# Patient Record
Sex: MALE | Race: WHITE | Employment: FULL TIME | URBAN - METROPOLITAN AREA
[De-identification: names, ages, dates, MRNs, and addresses within clinical notes are randomized per-mention and may not be internally consistent; named-entity substitution may affect disease eponyms.]

---

## 2017-06-22 PROBLEM — E11.42 DM TYPE 2 WITH DIABETIC PERIPHERAL NEUROPATHY (HCC): Status: ACTIVE | Noted: 2017-06-22

## 2017-06-22 PROBLEM — L97.512 NON-PRESSURE CHRONIC ULCER OF OTHER PART OF RIGHT FOOT WITH FAT LAYER EXPOSED (HCC): Status: ACTIVE | Noted: 2017-06-22

## 2017-07-26 PROBLEM — I87.8 POOR VENOUS ACCESS: Status: ACTIVE | Noted: 2017-07-26

## 2019-12-14 ENCOUNTER — HOSPITAL ENCOUNTER (OUTPATIENT)
Age: 44
Discharge: HOME | End: 2019-12-16
Payer: COMMERCIAL

## 2019-12-14 PROCEDURE — 87075 CULTR BACTERIA EXCEPT BLOOD: CPT

## 2019-12-14 PROCEDURE — 87070 CULTURE OTHR SPECIMN AEROBIC: CPT

## 2019-12-14 PROCEDURE — 87205 SMEAR GRAM STAIN: CPT

## 2019-12-15 LAB — GRAM STAIN ORDERABLE: NORMAL

## 2019-12-16 LAB — WOUND/ABSCESS: NORMAL

## 2019-12-17 LAB
ANAEROBIC CULTURE: ABNORMAL
ORGANISM: ABNORMAL

## 2021-03-25 ENCOUNTER — ANESTHESIA EVENT (OUTPATIENT)
Dept: OPERATING ROOM | Age: 46
End: 2021-03-25
Payer: COMMERCIAL

## 2021-03-25 ENCOUNTER — TELEPHONE (OUTPATIENT)
Dept: PODIATRY | Age: 46
End: 2021-03-25

## 2021-03-25 RX ORDER — MULTIVIT WITH MINERALS/LUTEIN
1000 TABLET ORAL DAILY
COMMUNITY

## 2021-03-25 RX ORDER — DULAGLUTIDE 1.5 MG/.5ML
1.5 INJECTION, SOLUTION SUBCUTANEOUS WEEKLY
COMMUNITY

## 2021-03-26 ENCOUNTER — ANESTHESIA (OUTPATIENT)
Dept: OPERATING ROOM | Age: 46
End: 2021-03-26
Payer: COMMERCIAL

## 2021-03-26 ENCOUNTER — HOSPITAL ENCOUNTER (OUTPATIENT)
Age: 46
Setting detail: OUTPATIENT SURGERY
Discharge: HOME | End: 2021-03-26
Attending: PODIATRIST | Admitting: PODIATRIST
Payer: COMMERCIAL

## 2021-03-26 VITALS
DIASTOLIC BLOOD PRESSURE: 78 MMHG | OXYGEN SATURATION: 99 % | RESPIRATION RATE: 32 BRPM | SYSTOLIC BLOOD PRESSURE: 113 MMHG | TEMPERATURE: 95.5 F

## 2021-03-26 VITALS
TEMPERATURE: 97.8 F | SYSTOLIC BLOOD PRESSURE: 122 MMHG | HEART RATE: 91 BPM | HEIGHT: 74 IN | DIASTOLIC BLOOD PRESSURE: 82 MMHG | OXYGEN SATURATION: 98 % | BODY MASS INDEX: 35.94 KG/M2 | WEIGHT: 280 LBS | RESPIRATION RATE: 21 BRPM

## 2021-03-26 DIAGNOSIS — G89.18 POST-OP PAIN: Primary | ICD-10-CM

## 2021-03-26 DIAGNOSIS — Z01.812 PRE-OPERATIVE LABORATORY EXAMINATION: ICD-10-CM

## 2021-03-26 LAB
ANION GAP SERPL CALCULATED.3IONS-SCNC: 11 MMOL/L (ref 7–16)
BUN BLDV-MCNC: 16 MG/DL (ref 6–20)
CALCIUM SERPL-MCNC: 8.9 MG/DL (ref 8.6–10.2)
CHLORIDE BLD-SCNC: 104 MMOL/L (ref 98–107)
CO2: 25 MMOL/L (ref 22–29)
CREAT SERPL-MCNC: 0.7 MG/DL (ref 0.7–1.2)
GFR AFRICAN AMERICAN: >60
GFR NON-AFRICAN AMERICAN: >60 ML/MIN/1.73
GLUCOSE BLD-MCNC: 121 MG/DL (ref 74–99)
HCT VFR BLD CALC: 43 % (ref 37–54)
HEMOGLOBIN: 14.4 G/DL (ref 12.5–16.5)
MCH RBC QN AUTO: 32.6 PG (ref 26–35)
MCHC RBC AUTO-ENTMCNC: 33.5 % (ref 32–34.5)
MCV RBC AUTO: 97.3 FL (ref 80–99.9)
METER GLUCOSE: 129 MG/DL (ref 74–99)
PDW BLD-RTO: 12.8 FL (ref 11.5–15)
PLATELET # BLD: 334 E9/L (ref 130–450)
PMV BLD AUTO: 9.3 FL (ref 7–12)
POTASSIUM SERPL-SCNC: 4.2 MMOL/L (ref 3.5–5)
RBC # BLD: 4.42 E12/L (ref 3.8–5.8)
SODIUM BLD-SCNC: 140 MMOL/L (ref 132–146)
WBC # BLD: 8.3 E9/L (ref 4.5–11.5)

## 2021-03-26 PROCEDURE — 2709999900 HC NON-CHARGEABLE SUPPLY: Performed by: PODIATRIST

## 2021-03-26 PROCEDURE — 3600000012 HC SURGERY LEVEL 2 ADDTL 15MIN: Performed by: PODIATRIST

## 2021-03-26 PROCEDURE — 7100000011 HC PHASE II RECOVERY - ADDTL 15 MIN: Performed by: PODIATRIST

## 2021-03-26 PROCEDURE — 2580000003 HC RX 258

## 2021-03-26 PROCEDURE — 6360000002 HC RX W HCPCS

## 2021-03-26 PROCEDURE — 3700000001 HC ADD 15 MINUTES (ANESTHESIA): Performed by: PODIATRIST

## 2021-03-26 PROCEDURE — 6360000002 HC RX W HCPCS: Performed by: STUDENT IN AN ORGANIZED HEALTH CARE EDUCATION/TRAINING PROGRAM

## 2021-03-26 PROCEDURE — 6360000002 HC RX W HCPCS: Performed by: ANESTHESIOLOGIST ASSISTANT

## 2021-03-26 PROCEDURE — 3700000000 HC ANESTHESIA ATTENDED CARE: Performed by: PODIATRIST

## 2021-03-26 PROCEDURE — 7100000010 HC PHASE II RECOVERY - FIRST 15 MIN: Performed by: PODIATRIST

## 2021-03-26 PROCEDURE — 36415 COLL VENOUS BLD VENIPUNCTURE: CPT

## 2021-03-26 PROCEDURE — 7100000000 HC PACU RECOVERY - FIRST 15 MIN: Performed by: PODIATRIST

## 2021-03-26 PROCEDURE — 82962 GLUCOSE BLOOD TEST: CPT

## 2021-03-26 PROCEDURE — 3600000002 HC SURGERY LEVEL 2 BASE: Performed by: PODIATRIST

## 2021-03-26 PROCEDURE — 85027 COMPLETE CBC AUTOMATED: CPT

## 2021-03-26 PROCEDURE — 2720000010 HC SURG SUPPLY STERILE: Performed by: PODIATRIST

## 2021-03-26 PROCEDURE — 2500000003 HC RX 250 WO HCPCS

## 2021-03-26 PROCEDURE — 7100000001 HC PACU RECOVERY - ADDTL 15 MIN: Performed by: PODIATRIST

## 2021-03-26 PROCEDURE — 80048 BASIC METABOLIC PNL TOTAL CA: CPT

## 2021-03-26 RX ORDER — MEPERIDINE HYDROCHLORIDE 25 MG/ML
12.5 INJECTION INTRAMUSCULAR; INTRAVENOUS; SUBCUTANEOUS EVERY 5 MIN PRN
Status: DISCONTINUED | OUTPATIENT
Start: 2021-03-26 | End: 2021-03-26 | Stop reason: HOSPADM

## 2021-03-26 RX ORDER — LIDOCAINE HYDROCHLORIDE 20 MG/ML
INJECTION, SOLUTION INTRAVENOUS PRN
Status: DISCONTINUED | OUTPATIENT
Start: 2021-03-26 | End: 2021-03-26 | Stop reason: SDUPTHER

## 2021-03-26 RX ORDER — ONDANSETRON 2 MG/ML
INJECTION INTRAMUSCULAR; INTRAVENOUS PRN
Status: DISCONTINUED | OUTPATIENT
Start: 2021-03-26 | End: 2021-03-26 | Stop reason: SDUPTHER

## 2021-03-26 RX ORDER — NEOSTIGMINE METHYLSULFATE 1 MG/ML
INJECTION, SOLUTION INTRAVENOUS PRN
Status: DISCONTINUED | OUTPATIENT
Start: 2021-03-26 | End: 2021-03-26 | Stop reason: SDUPTHER

## 2021-03-26 RX ORDER — ROCURONIUM BROMIDE 10 MG/ML
INJECTION, SOLUTION INTRAVENOUS PRN
Status: DISCONTINUED | OUTPATIENT
Start: 2021-03-26 | End: 2021-03-26 | Stop reason: SDUPTHER

## 2021-03-26 RX ORDER — FENTANYL CITRATE 50 UG/ML
INJECTION, SOLUTION INTRAMUSCULAR; INTRAVENOUS PRN
Status: DISCONTINUED | OUTPATIENT
Start: 2021-03-26 | End: 2021-03-26 | Stop reason: SDUPTHER

## 2021-03-26 RX ORDER — DEXAMETHASONE SODIUM PHOSPHATE 10 MG/ML
INJECTION, SOLUTION INTRAMUSCULAR; INTRAVENOUS PRN
Status: DISCONTINUED | OUTPATIENT
Start: 2021-03-26 | End: 2021-03-26 | Stop reason: SDUPTHER

## 2021-03-26 RX ORDER — SODIUM CHLORIDE 0.9 % (FLUSH) 0.9 %
10 SYRINGE (ML) INJECTION EVERY 12 HOURS SCHEDULED
Status: DISCONTINUED | OUTPATIENT
Start: 2021-03-26 | End: 2021-03-26 | Stop reason: HOSPADM

## 2021-03-26 RX ORDER — GLYCOPYRROLATE 1 MG/5 ML
SYRINGE (ML) INTRAVENOUS PRN
Status: DISCONTINUED | OUTPATIENT
Start: 2021-03-26 | End: 2021-03-26 | Stop reason: SDUPTHER

## 2021-03-26 RX ORDER — FLUTICASONE PROPIONATE 50 MCG
1 SPRAY, SUSPENSION (ML) NASAL DAILY
COMMUNITY

## 2021-03-26 RX ORDER — SODIUM CHLORIDE 9 MG/ML
INJECTION, SOLUTION INTRAVENOUS CONTINUOUS PRN
Status: DISCONTINUED | OUTPATIENT
Start: 2021-03-26 | End: 2021-03-26 | Stop reason: SDUPTHER

## 2021-03-26 RX ORDER — HYDROMORPHONE HCL 110MG/55ML
PATIENT CONTROLLED ANALGESIA SYRINGE INTRAVENOUS PRN
Status: DISCONTINUED | OUTPATIENT
Start: 2021-03-26 | End: 2021-03-26 | Stop reason: SDUPTHER

## 2021-03-26 RX ORDER — SODIUM CHLORIDE 0.9 % (FLUSH) 0.9 %
10 SYRINGE (ML) INJECTION PRN
Status: DISCONTINUED | OUTPATIENT
Start: 2021-03-26 | End: 2021-03-26 | Stop reason: HOSPADM

## 2021-03-26 RX ORDER — MIDAZOLAM HYDROCHLORIDE 1 MG/ML
INJECTION INTRAMUSCULAR; INTRAVENOUS PRN
Status: DISCONTINUED | OUTPATIENT
Start: 2021-03-26 | End: 2021-03-26 | Stop reason: SDUPTHER

## 2021-03-26 RX ORDER — OXYCODONE AND ACETAMINOPHEN 7.5; 325 MG/1; MG/1
1 TABLET ORAL EVERY 8 HOURS PRN
Qty: 90 TABLET | Refills: 0 | Status: SHIPPED | OUTPATIENT
Start: 2021-03-26 | End: 2021-04-25

## 2021-03-26 RX ORDER — PROPOFOL 10 MG/ML
INJECTION, EMULSION INTRAVENOUS PRN
Status: DISCONTINUED | OUTPATIENT
Start: 2021-03-26 | End: 2021-03-26 | Stop reason: SDUPTHER

## 2021-03-26 RX ADMIN — SODIUM CHLORIDE: 9 INJECTION, SOLUTION INTRAVENOUS at 06:50

## 2021-03-26 RX ADMIN — HYDROMORPHONE HYDROCHLORIDE 2 MG: 2 INJECTION, SOLUTION INTRAMUSCULAR; INTRAVENOUS; SUBCUTANEOUS at 07:18

## 2021-03-26 RX ADMIN — MIDAZOLAM 2 MG: 1 INJECTION INTRAMUSCULAR; INTRAVENOUS at 06:48

## 2021-03-26 RX ADMIN — PROPOFOL 200 MG: 10 INJECTION, EMULSION INTRAVENOUS at 07:05

## 2021-03-26 RX ADMIN — LIDOCAINE HYDROCHLORIDE 100 MG: 20 INJECTION, SOLUTION INTRAVENOUS at 07:05

## 2021-03-26 RX ADMIN — ONDANSETRON HYDROCHLORIDE 4 MG: 2 INJECTION, SOLUTION INTRAMUSCULAR; INTRAVENOUS at 07:32

## 2021-03-26 RX ADMIN — Medication 2 G: at 07:05

## 2021-03-26 RX ADMIN — Medication 3 MG: at 07:27

## 2021-03-26 RX ADMIN — DEXAMETHASONE SODIUM PHOSPHATE 10 MG: 10 INJECTION, SOLUTION INTRAMUSCULAR; INTRAVENOUS at 07:15

## 2021-03-26 RX ADMIN — Medication 0.6 MG: at 07:27

## 2021-03-26 RX ADMIN — FENTANYL CITRATE 50 MCG: 50 INJECTION, SOLUTION INTRAMUSCULAR; INTRAVENOUS at 07:05

## 2021-03-26 RX ADMIN — FENTANYL CITRATE 200 MCG: 50 INJECTION, SOLUTION INTRAMUSCULAR; INTRAVENOUS at 06:57

## 2021-03-26 RX ADMIN — ROCURONIUM BROMIDE 40 MG: 10 INJECTION, SOLUTION INTRAVENOUS at 07:05

## 2021-03-26 ASSESSMENT — PULMONARY FUNCTION TESTS
PIF_VALUE: 61
PIF_VALUE: 24
PIF_VALUE: 0
PIF_VALUE: 24
PIF_VALUE: 26
PIF_VALUE: 24
PIF_VALUE: 43
PIF_VALUE: 3
PIF_VALUE: 24
PIF_VALUE: 25
PIF_VALUE: 2
PIF_VALUE: 24
PIF_VALUE: 25
PIF_VALUE: 25
PIF_VALUE: 0
PIF_VALUE: 24

## 2021-03-26 ASSESSMENT — PAIN DESCRIPTION - ORIENTATION: ORIENTATION: RIGHT

## 2021-03-26 ASSESSMENT — PAIN DESCRIPTION - LOCATION: LOCATION: FOOT

## 2021-03-26 NOTE — PROGRESS NOTES
Admitted to [facility]. Instructions reviewed. Covid test done:3/22    Results:not detected    Self quarantine guidelines followed since tested? yes    Any unusual S/S or concerns expressed/observed?  no
Office notified that orders are needed for procedure.
Pt having covid test done at work will fax to us
inhalers the morning of surgery. Bring your emergency inhaler with you day of surgery. [] Follow physician instructions regarding any blood thinners you may be taking. WHAT TO EXPECT:  [x] The day of surgery you will be greeted and checked in by the [employer].  In addition, you will be registered in the [place] by a Patient Access Representative. Please bring your photo ID and insurance card. A nurse will greet you in accordance to the time you are needed in the pre-op area to prepare you for surgery. Please do not be discouraged if you are not greeted in the order you arrive as there are many variables that are involved in patient preparation. Your patience is greatly appreciated as you wait for your nurse. Please bring in items such as: books, magazines, newspapers, electronics, or any other items  to occupy your time in the waiting area. []  Delays may occur with surgery and staff will make a sincere effort to keep you informed of delays. If any delays occur with your procedure, we apologize ahead of time for your inconvenience as we recognize the value of your time.

## 2021-03-26 NOTE — ANESTHESIA PRE PROCEDURE
Department of Anesthesiology  Preprocedure Note       Name:  Santi Bond   Age:  48 y.o.  :  1975                                          MRN:  [id number]         Date:  3/26/2021      Surgeon: Kayley Royal):  Vasu Lisa DPM    Procedure: Procedure(s):  GASTROCNEMIUS RECESSION RIGHT FOOT    Medications prior to admission:   Prior to Admission medications    Medication Sig Start Date End Date Taking?  Authorizing Provider   fluticasone (FLONASE) 50 MCG/ACT nasal spray 1 spray by Each Nostril route daily   Yes Historical Provider, MD   Ascorbic Acid (VITAMIN C) 1000 MG tablet Take 1,000 mg by mouth daily   Yes Historical Provider, MD   Dulaglutide (TRULICITY) 1.5 WL/1.4DN SOPN Inject 1.5 mg into the skin once a week    Yes Historical Provider, MD   glipiZIDE (GLUCOTROL) 5 MG tablet Take 5 mg by mouth 2 times daily (before meals)   Yes Historical Provider, MD   metFORMIN (GLUCOPHAGE) 1000 MG tablet Take 1 tablet by mouth 2 times daily (with meals) 2/15/16  Yes Ilene Needs, APRN - CNP   cetirizine (ZYRTEC ALLERGY) 10 MG tablet Take 1 tablet by mouth daily  Patient taking differently: Take 10 mg by mouth daily as needed  2/15/16  Yes Ilene Needs, APRN - CNP   lisinopril (PRINIVIL;ZESTRIL) 20 MG tablet Take 1 tablet by mouth daily 2/15/16  Yes Ilene Needs, APRN - CNP   levothyroxine (SYNTHROID) 150 MCG tablet Take 175 mcg by mouth Daily    Yes Historical Provider, MD   atorvastatin (LIPITOR) 20 MG tablet Take 20 mg by mouth nightly    Yes Historical Provider, MD   Multiple Vitamins-Minerals (THERAPEUTIC MULTIVITAMIN-MINERALS) tablet Take 1 tablet by mouth daily   Yes Historical Provider, MD   Blood Glucose Monitoring Suppl (ACCU-CHEK ADVANTAGE DIABETES) KIT 1 each by Does not apply route daily 2/15/16   Farheen Garcia MD       Current medications:    Current Facility-Administered Medications   Medication Dose Route Frequency Provider Last Rate Last Admin    ceFAZolin (ANCEF) 2000 mg in sterile water 20 mL IV syringe  2,000 mg Intravenous 30 Min Pre-Op Bakari Ahammad, DPM        sodium chloride flush 0.9 % injection 10 mL  10 mL Intravenous 2 times per day Bakari Ahammad, DPM        sodium chloride flush 0.9 % injection 10 mL  10 mL Intravenous PRN Bakari Ahammad, DPM           Allergies:  No Known Allergies    Problem List:    Patient Active Problem List   Diagnosis Code    Diabetes mellitus type 2, uncontrolled ([facility] E11.65    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    Morbid obesity ([facility] E66.01    Community acquired pneumonia J18.9    SIRS (systemic inflammatory response syndrome) ([facility]) R65.10    Lactic acidosis E87.2    RAY on CPAP G47.33, Z99.89    Pneumonia of right middle lobe due to infectious organism J18.9    Non-pressure chronic 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 ([facility] L97.512    DM type 2 with diabetic peripheral neuropathy ([facility]) E11.42    Poor venous access I87.8       Past Medical History:        Diagnosis Date    Diabetes mellitus ([facility]     Hypertension     Sleep apnea     Thyroid disease        Past Surgical History:        Procedure Laterality Date    EYE SURGERY      SINUS SURGERY      TONSILLECTOMY         Social History:    Social History     Tobacco Use    Smoking status: Former Smoker     Packs/day: 0.25     Types: Cigarettes     Quit date: 2020     Years since quittin.3   Substance Use Topics    Alcohol use: Yes     Comment: occ                                Counseling given: Not Answered      Vital Signs (Current):   Vitals:    21 0814 21 0536   BP:  128/77   Pulse:  95   Resp:  18   Temp:  36.2 °C (97.1 °F)   TempSrc:  Temporal   SpO2:  96%   Weight: 280 lb (127 kg) 280 lb (127 kg)   Height: 6' 2\" (1.88 m) 6' 2\" (1.88 m)                                              BP Readings from Last 3 Encounters:   21 128/77   17 112/68   17 126/84       NPO Status: Time of last liquid consumption:  asthma (history of childhood asthma):                            Cardiovascular:  Exercise tolerance: good (>4 METS),   (+) hypertension: mild, hyperlipidemia      ECG reviewed  Rhythm: regular  Rate: normal           Beta Blocker:  Not on Beta Blocker         Neuro/Psych:               GI/Hepatic/Renal:             Endo/Other:    (+) DiabetesType II DM, well controlled, , hypothyroidism::., .                 Abdominal:           Vascular:                                      Anesthesia Plan      general     ASA 3         BIS  MIPS: Postoperative opioids intended, Prophylactic antiemetics administered and Postoperative trial extubation. Anesthetic plan and risks discussed with patient. Use of blood products discussed with patient whom consented to blood products. Plan discussed with CRNA and attending.                   Julianna Ross, RN   3/26/2021

## 2021-03-26 NOTE — BRIEF OP NOTE
Brief Postoperative Note      Patient: Andrew Adrian  YOB: 1975  MRN: [id number]    Date of Procedure: 3/26/2021    Pre-Op Diagnosis: ANKLE EQUINUS    Post-Op Diagnosis: Same       Procedure(s):  GASTROCNEMIUS RECESSION RIGHT FOOT    Surgeon(s):  GALILEO Alaniz DPM    Assistant:   Roni Balderas PGY-1     Anesthesia: General    Estimated Blood Loss (mL): 64NV'H    Complications: None    Specimens:   * No specimens in log *    Implants:  * No implants in log *      Drains: * No LDAs found *    Findings: None     Electronically signed by Roni Balderas MD on 3/26/2021 at 7:50 AM

## 2021-03-26 NOTE — OP NOTE
Operative Note      Patient: Jenni Carvajal  YOB: 1975  MRN: [id number]    Date of Procedure: 3/26/2021    Pre-Op Diagnosis: ANKLE EQUINUS    Post-Op Diagnosis: Same       Procedure(s):  GASTROCNEMIUS RECESSION RIGHT FOOT    Surgeon(s):  GALILEO Wilson DPM    Assistant:   * No surgical staff found *    Anesthesia: General    Estimated Blood Loss (mL): 10 ccs    Complications: None    Specimens:   * No specimens in log *    Implants:  * No implants in log *      Drains: * No LDAs found *    Findings: Patient had gastrocnemius contracture at the right ankle. Detailed Description of Procedure:     Patient was brought into the operating room and placed in the supine position. After anesthesia was obtained the patient was scrubbed, prepped and draped in the usual aseptic manor. No tourniquet was used for this procedure. Attention was directed to the posterior medial aspect of the leg (middle 1/3), where a 3 cm vertical incision was made. Further blunt dissection was carried through the sub-Q and deep fascia until the aponeurosis of the gastrocnemius was identified. Care was take to protect all neurovascular structures, which were protected with [employer] retractors. At this time the aponeurosis of the gastrocnemius was transected in a horizontal medial and lateral fashion. Good range of motion was appreciated at the ankle joint as the gastroc aponeurosis was released giving the patient approximately 12 degrees of dorsiflexion at the ankle joint. The surgical incision was than coapted  utiilizing 4-0 nylon in a horizontal mattress technique. A dry sterile compressive dressing was applied and the patient was placed in a full length cam boot. The patient was transferred to PACU with neurovascular status intact and vital signs stable.       Electronically signed by Bob Clarke DPM on 3/26/2021 at 7:40 AM

## 2021-03-26 NOTE — DISCHARGE INSTR - COC
Continuity of Care Form    Patient Name: David Aranda   :  1975  MRN:  [id number]    [facility] date:  3/26/2021  Discharge date:  ***    Code Status Order: Full Code   Advance Directives:   Advance Care Flowsheet Documentation     Date/Time Healthcare Directive Type of Healthcare Directive Copy in [address] Agent's Name Healthcare Agent's Phone Number    21 0820  No, patient does not have an advance directive for healthcare treatment -- -- -- -- --          Admitting Physician:  Isaak Alberto DPM  PCP: Karuna Bishop    Discharging Nurse: [facility] Unit/Room#: [facility] /NONE  Discharging Unit Phone Number: ***    Emergency Contact:   Extended Emergency Contact Information  Primary Emergency Contact: [address] [place] [address] Phone: [phone]  Relation: Child  Secondary Emergency Contact: Brandy Portillo  [place] Phone: [phone]  Relation: Parent    Past Surgical History:  Past Surgical History:   Procedure Laterality Date    EYE SURGERY      SINUS SURGERY      TONSILLECTOMY         Immunization History:   Immunization History   Administered Date(s) Administered    Pneumococcal Polysaccharide ([employer]ana[employer]) 2016    Tdap (Boostrix, Adacel) 2015       Active Problems:  Patient Active Problem List   Diagnosis Code    Diabetes mellitus type 2, uncontrolled ([address] E11.65    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    Morbid obesity ([address] E66.01    Community acquired pneumonia J18.9    SIRS (systemic inflammatory response syndrome) ([address] R65.10    Lactic acidosis E87.2    RAY on CPAP G47.33, Z99.89    Pneumonia of right middle lobe due to infectious organism J18.9    Non-pressure chronic 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 ([address] L97.512    DM type 2 with diabetic peripheral neuropathy ([address] E11.42    Poor venous access I87.8       Isolation/Infection:   Isolation          No Isolation        Patient Infection Status     None to display          Nurse Assessment:  Last Vital Signs: /81   Pulse 97   Temp 97.8 °F (36.6 °C) (Bladder)   Resp 16   Ht 6' 2\" (1.88 m)   Wt 280 lb (127 kg)   SpO2 96%   BMI 35.95 kg/m²     Last documented pain score (0-10 scale): Pain Level: 0  Last Weight:   Wt Readings from Last 1 Encounters:   03/26/21 280 lb (127 kg)     Mental Status:  {IP PT MENTAL STATUS:87344}    IV Access:  { PABLO IV ACCESS:872341382}    Nursing Mobility/ADLs:  Walking   {[facility] DME PDTZ:649506017}  Transfer  {[facility] DME AZVZ:647751112}  Bathing  {[facility] DME UAFW:861455339}  Dressing  {[facility] DME JYWK:874438279}  Toileting  {[facility] DME HAXE:706671687}  Feeding  {[facility] DME QCUI:890787429}  Med Admin  {[facility] DME CGRS:907181470}  Med Delivery   { PABLO MED Delivery:046408291}    Wound Care Documentation and Therapy:  Wound Other (Comment) Foot Right wound 2'x1' (Active)   Dressing Status Clean;Dry; Intact 03/26/21 0748   Dressing/Treatment Ace wrap 03/26/21 0748   Number of days:        Diabetic Ulcer 06/22/17 Toe (Comment which one) Right;Plantar #1 right great toe plantar mcguire II DFU acq: 5-1-17 (Active)   Number of days: 1372        Elimination:  Continence:   · Bowel: {YES / ML:67741}  · Bladder: {YES / VV:39285}  Urinary Catheter: {Urinary Catheter:180509420}   Colostomy/Ileostomy/Ileal Conduit: {YES / :10157}       Date of Last BM: ***    Intake/Output Summary (Last 24 hours) at 3/26/2021 0802  Last data filed at 3/26/2021 0739  Gross per 24 hour   Intake 600 ml   Output 10 ml   Net 590 ml     No intake/output data recorded.     Safety Concerns:     [employer] [facility] Safety Concerns:590563101}    Impairments/Disabilities:      [employer] Impairments/Disabilities:560562108}    Nutrition Therapy:  Current Nutrition Therapy:   [employer] [facility] Diet List:627166450}    Routes of Feeding: {CHP DME Other Feedings:594822570}  Liquids: {Slp liquid thickness:66277}  Daily Fluid Restriction: {CHP DME Yes amt example:150615541}  Last Modified Barium Swallow with Video (Video Swallowing Test): {Done Not Done YQXW:982030401}    Treatments at the Time of Hospital Discharge:   Respiratory Treatments: ***  Oxygen Therapy:  {Therapy; copd oxygen:61109}  Ventilator:    {MH CC Vent YQAO:993186669}    Rehab Therapies: {THERAPEUTIC INTERVENTION:2473541476}  Weight Bearing Status/Restrictions: { CC Weight Bearin}  Other Medical Equipment (for information only, NOT a DME order):  {EQUIPMENT:307164873}  Other Treatments: ***    Patient's personal belongings (please select all that are sent with patient):  {[facility] DME Belongings:241035176}    RN SIGNATURE:  {Esignature:791199343}    CASE MANAGEMENT/SOCIAL WORK SECTION    Inpatient Status Date: ***    Readmission Risk Assessment Score:  Readmission Risk              Risk of Unplanned Readmission:        0           Discharging to Facility/ Agency   · Name:   · Address:  · Phone:  · Fax:    Dialysis Facility (if applicable)   · Name:  · Address:  · Dialysis Schedule:  · Phone:  · Fax:    / signature: {Esignature:441711374}    PHYSICIAN SECTION    Prognosis: {Prognosis:8369099302}    Condition at Discharge: [address] [place] [address] Patient Condition:486379040}    Rehab Potential (if transferring to Rehab): {Prognosis:1598542930}    Recommended Labs or Other Treatments After Discharge: ***    Physician Certification: I certify the above information and transfer of Edmund Recio  is necessary for the continuing treatment of the diagnosis listed and that he requires {Admit to Appropriate Level of Care:65053} for {GREATER/LESS:098186308} 30 days.      Update Admission H&P: {CHP DME Changes in [facility]:494285300}    PHYSICIAN SIGNATURE:  {Esignature:264463774}

## 2021-03-26 NOTE — ANESTHESIA POSTPROCEDURE EVALUATION
Department of Anesthesiology  Postprocedure Note    Patient: Tyler Batista  MRN: [id number]  [place]: 1975  Date of evaluation: 3/26/2021  Time:  10:47 AM     Procedure Summary     Date: 03/26/21 Room / Location: [facility] OR 04 / [facility]    Anesthesia Start: 0555 Anesthesia Stop: 1132    Procedure: GASTROCNEMIUS RECESSION RIGHT FOOT (Right Leg Lower) Diagnosis: (ANKLE EQUINUS)    Surgeons: Herberth Carroll DPM Responsible Provider: Etienne Oconnor MD    Anesthesia Type: general ASA Status: 3          Anesthesia Type: general    Cruz Phase I: Cruz Score: 10    Cruz Phase II: Cruz Score: 10    Last vitals: Reviewed and per EMR flowsheets.        Anesthesia Post Evaluation    Patient location during evaluation: PACU  Patient participation: complete - patient participated  Level of consciousness: awake  Pain score: 0  Airway patency: patent  Nausea & Vomiting: no nausea  Complications: no  Cardiovascular status: blood pressure returned to baseline  Respiratory status: acceptable  Hydration status: euvolemic

## 2021-03-26 NOTE — ANESTHESIA PRE PROCEDURE
Department of Anesthesiology  Preprocedure Note       Name:  Andrew Adrian   Age:  48 y.o.  :  1975                                          MRN:  [id number]         Date:  3/26/2021      Surgeon: Kelly Salgado):  Camelia Hutchins DPM    Procedure: Procedure(s):  GASTROCNEMIUS RECESSION RIGHT FOOT    Medications prior to admission:   Prior to Admission medications    Medication Sig Start Date End Date Taking?  Authorizing Provider   fluticasone (FLONASE) 50 MCG/ACT nasal spray 1 spray by Each Nostril route daily   Yes Historical Provider, MD   Ascorbic Acid (VITAMIN C) 1000 MG tablet Take 1,000 mg by mouth daily   Yes Historical Provider, MD   Dulaglutide (TRULICITY) 1.5 IB/5.6ON SOPN Inject 1.5 mg into the skin once a week    Yes Historical Provider, MD   glipiZIDE (GLUCOTROL) 5 MG tablet Take 5 mg by mouth 2 times daily (before meals)   Yes Historical Provider, MD   metFORMIN (GLUCOPHAGE) 1000 MG tablet Take 1 tablet by mouth 2 times daily (with meals) 2/15/16  Yes MALI Islas CNP   cetirizine (ZYRTEC ALLERGY) 10 MG tablet Take 1 tablet by mouth daily  Patient taking differently: Take 10 mg by mouth daily as needed  2/15/16  Yes MALI Islas CNP   lisinopril (PRINIVIL;ZESTRIL) 20 MG tablet Take 1 tablet by mouth daily 2/15/16  Yes MALI Islas CNP   levothyroxine (SYNTHROID) 150 MCG tablet Take 175 mcg by mouth Daily    Yes Historical Provider, MD   atorvastatin (LIPITOR) 20 MG tablet Take 20 mg by mouth nightly    Yes Historical Provider, MD   Multiple Vitamins-Minerals (THERAPEUTIC MULTIVITAMIN-MINERALS) tablet Take 1 tablet by mouth daily   Yes Historical Provider, MD   Blood Glucose Monitoring Suppl (ACCU-CHEK ADVANTAGE DIABETES) KIT 1 each by Does not apply route daily 2/15/16   Marvin Lay MD       Current medications:    Current Facility-Administered Medications   Medication Dose Route Frequency Provider Last Rate Last Admin    ceFAZolin (ANCEF) 2000 mg in sterile water 20 mL IV syringe  2,000 mg Intravenous 30 Min Pre-Op Bakari Ahammad, DPM        sodium chloride flush 0.9 % injection 10 mL  10 mL Intravenous 2 times per day Bakari Ahammad, DPM        sodium chloride flush 0.9 % injection 10 mL  10 mL Intravenous PRN Bakari Ahammad, DPM           Allergies:  No Known Allergies    Problem List:    Patient Active Problem List   Diagnosis Code    Diabetes mellitus type 2, uncontrolled ([facility] E11.65    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    Morbid obesity ([facility] E66.01    Community acquired pneumonia J18.9    SIRS [facility]systemic inflammatory response syndrome) ([facility]) R65.10    Lactic acidosis E87.2    RAY on CPAP G47.33, Z99.89    Pneumonia of right middle lobe due to infectious organism J18.9    Non-pressure chronic 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 ([facility] L97.512    DM type 2 with diabetic peripheral neuropathy ([facility]) E11.42    Poor venous access I87.8       Past Medical History:        Diagnosis Date    Diabetes mellitus ([facility]     Hypertension     Sleep apnea     Thyroid disease        Past Surgical History:        Procedure Laterality Date    EYE SURGERY      SINUS SURGERY      TONSILLECTOMY         Social History:    Social History     Tobacco Use    Smoking status: Former Smoker     Packs/day: 0.25     Types: Cigarettes     Quit date: 2020     Years since quittin.3   Substance Use Topics    Alcohol use: Yes     Comment: occ                                Counseling given: Not Answered      Vital Signs (Current):   Vitals:    21 0814 21 0536   BP:  128/77   Pulse:  95   Resp:  18   Temp:  97.1 °F (36.2 °C)   TempSrc:  Temporal   SpO2:  96%   Weight: 280 lb (127 kg) 280 lb (127 kg)   Height: 6' 2\" (1.88 m) 6' 2\" (1.88 m)                                              BP Readings from Last 3 Encounters:   21 128/77   17 112/68   17 126/84       NPO Status: Time of last liquid consumption:  Time of last solid consumption: 1900                        Date of last liquid consumption: 03/25/21                        Date of last solid food consumption: 03/25/21    BMI:   Wt Readings from Last 3 Encounters:   03/26/21 280 lb (127 kg)   06/22/17 280 lb (127 kg)   06/02/17 280 lb (127 kg)     Body mass index is 35.95 kg/m². CBC:   Lab Results   Component Value Date    WBC 8.3 03/26/2021    RBC 4.42 03/26/2021    HGB 14.4 03/26/2021    HCT 43.0 03/26/2021    MCV 97.3 03/26/2021    RDW 12.8 03/26/2021     03/26/2021       CMP:   Lab Results   Component Value Date     03/26/2021    K 4.2 03/26/2021     03/26/2021    CO2 25 03/26/2021    BUN 16 03/26/2021    CREATININE 0.7 03/26/2021    GFRAA >60 03/26/2021    LABGLOM >60 03/26/2021    GLUCOSE 121 03/26/2021    PROT 6.7 08/24/2017    CALCIUM 8.9 03/26/2021    BILITOT 0.5 08/24/2017    ALKPHOS 76 08/24/2017    AST 16 08/24/2017    ALT 24 08/24/2017       POC Tests: No results for input(s): POCGLU, POCNA, POCK, POCCL, POCBUN, POCHEMO, POCHCT in the last 72 hours. Coags: No results found for: PROTIME, INR, APTT    HCG (If Applicable): No results found for: PREGTESTUR, PREGSERUM, HCG, HCGQUANT     ABGs: No results found for: PHART, PO2ART, MDW4LVY, FNG8THQ, BEART, Z5YOWQMA     Type & Screen (If Applicable):  No results found for: LABABO, LABRH    Drug/Infectious Status (If Applicable):  No results found for: HIV, HEPCAB    COVID-19 Screening (If Applicable): No results found for: COVID19        Anesthesia Evaluation  Patient summary reviewed  Airway: Mallampati: II  TM distance: >3 FB   Neck ROM: full  Mouth opening: > = 3 FB Dental:          Pulmonary:   (+) pneumonia:  sleep apnea:                            ROS comment: Former Smoker. Cardiovascular:    (+) hypertension:,       ECG reviewed    Rate: normal           Beta Blocker:  Not on Beta Blocker      ROS comment: EKG: Normal Sinus Rhythm 90.      Neuro/Psych:   Negative Neuro/Psych ROS              GI/Hepatic/Renal: Neg GI/Hepatic/Renal ROS            Endo/Other:    (+) DiabetesType II DM, , hypothyroidism::., .                 Abdominal:   (+) obese,         Vascular: negative vascular ROS. Anesthesia Plan      general     ASA 3       Induction: intravenous. BIS  MIPS: Postoperative opioids intended. Anesthetic plan and risks discussed with patient. Plan discussed with CRNA.     Attending anesthesiologist reviewed and agrees with Anshu Carballo MD   3/26/2021

## 2022-08-30 RX ORDER — OMEPRAZOLE 20 MG/1
40 CAPSULE, DELAYED RELEASE ORAL DAILY
COMMUNITY

## 2022-08-30 NOTE — PROGRESS NOTES
[address]   PRE-ADMISSION TESTING GENERAL INSTRUCTIONS  PAT Phone Number: [phone]      GENERAL INSTRUCTIONS:    [x] Antibacterial Soap Shower Night before and/or AM of surgery. [] CHG Wipes instruction sheet and wipes given. [] Hibiclens shower the night before and the morning of surgery.   -Do not use Hibiclens on your face or head. [x] Do not wear makeup, lotions, powders, deodorant. [x] Nothing by mouth after midnight; including gum, candy, mints, or water. [x] You may brush your teeth, gargle, but do NOT swallow water. [x] No tobacco products, illegal drugs, or alcohol within 24 hours of your surgery. [x] Jewelry or valuables should not be brought to the hospital. All body and/or tongue piercing's must be removed prior to arriving to hospital. No contact lens or hair pins. [x] Arrange transportation with a responsible adult  to and from the hospital. Arrange for someone to be with you for the remainder of the day and for 24 hours after your procedure due to having had anesthesia. -Who will be your  for transportation?___parent_______________         -Who will be staying with you for 24 hrs after your procedure?____parent______________  [x] Bring insurance card and photo ID.  [] Bring copy of living will or healthcare power of  papers to be placed in your electronic record. [] Urine Pregnancy test will be preformed the day of surgery. A specimen sample may be brought from home. [] Transfusion Bracelet: Please bring with you to hospital, day of surgery. PARKING INSTRUCTIONS:     [x] ARRIVAL DATE & TIME: sept. 2, at 5am  [x] Enter into the [employer] [facility] [place]. Two people may accompany you. Masks are required. [x] Parking Lot \"I\" is where you will park. It is located on the corner of [facility] and [facility] [address] [facility]. The entrance is on [facility] [address] [facility].    Upon entering the parking lot, a voucher ticket will print    EDUCATION INSTRUCTIONS:        [] Knee or Hip replacement booklet & exercise pamphlets given. [x] [address] placed in chart. [] Pre-admission Testing educational folder given  [] Incentive Spirometry,coughing & deep breathing exercises reviewed. [] Medication information sheet(s)   [] Fluoroscopy-Xray used in surgery reviewed with patient. Educational pamphlet placed in chart. [x] Pain: Post-op pain is normal and to be expected. You will be asked to rate your pain from 0-10. [] Joint camp offered. [] Joint replacement booklets given.  [] Spine Navigator to see in PAT. [] Other:___________________________    MEDICATION INSTRUCTIONS:    [x] Bring a complete list of your medications, please write the last time you took the medicine, give this list to the nurse in Pre-Op. [] Take only the following medications the morning of surgery with 1-2 ounces of water: none  [x] Stop all herbal supplements and vitamins 5 days before surgery. Stop NSAIDS 7 days before surgery. Last doses 8/30  [x] DO NOT take any diabetic medicine the morning of surgery. Follow instructions for insulin the day before surgery. [] If you are diabetic and your blood sugar is low or you feel symptomatic, you may drink 1-2 ounces of apple juice or take a glucose tablet.            -The morning of your procedure, you may call the pre-op area if you have concerns about your blood sugar [phone]. [] Use your inhalers the morning of surgery. Bring your emergency inhaler with you day of surgery. [x] Follow physician instructions regarding any blood thinners you may be taking. WHAT TO EXPECT:    [] The day of surgery you will be greeted and checked in by the [facility] [employer] [facility].  In addition, you will be registered in the [facility] by a Patient Access Representative. Please bring your photo ID and insurance card.  A nurse will greet you in accordance to the time you are needed in the pre-op area to prepare you for surgery. Please do not be discouraged if you are not greeted in the order you arrive as there are many variables that are involved in patient preparation. Your patience is greatly appreciated as you wait for your nurse. Please bring in items such as: books, magazines, newspapers, electronics, or any other items  to occupy your time in the waiting area. []  Delays may occur with surgery and staff will make a sincere effort to keep you informed of delays. If any delays occur with your procedure, we apologize ahead of time for your inconvenience as we recognize the value of your time.

## 2022-09-01 ENCOUNTER — ANESTHESIA EVENT (OUTPATIENT)
Dept: OPERATING ROOM | Age: 47
End: 2022-09-01
Payer: COMMERCIAL

## 2022-09-02 ENCOUNTER — ANESTHESIA (OUTPATIENT)
Dept: OPERATING ROOM | Age: 47
End: 2022-09-02
Payer: COMMERCIAL

## 2022-09-02 ENCOUNTER — HOSPITAL ENCOUNTER (OUTPATIENT)
Age: 47
Setting detail: OUTPATIENT SURGERY
Discharge: HOME | End: 2022-09-02
Attending: PODIATRIST | Admitting: PODIATRIST
Payer: COMMERCIAL

## 2022-09-02 VITALS
BODY MASS INDEX: 37.86 KG/M2 | DIASTOLIC BLOOD PRESSURE: 77 MMHG | TEMPERATURE: 97.6 F | HEART RATE: 92 BPM | RESPIRATION RATE: 20 BRPM | HEIGHT: 74 IN | SYSTOLIC BLOOD PRESSURE: 122 MMHG | OXYGEN SATURATION: 97 % | WEIGHT: 295 LBS

## 2022-09-02 DIAGNOSIS — M86.9 OSTEOMYELITIS, UNSPECIFIED SITE, UNSPECIFIED TYPE (HCC): ICD-10-CM

## 2022-09-02 DIAGNOSIS — Z01.812 PRE-OPERATIVE LABORATORY EXAMINATION: Primary | ICD-10-CM

## 2022-09-02 DIAGNOSIS — M86.9 OSTEOMYELITIS OF TOE OF RIGHT FOOT (HCC): ICD-10-CM

## 2022-09-02 DIAGNOSIS — G89.18 ACUTE POSTOPERATIVE PAIN: ICD-10-CM

## 2022-09-02 LAB — METER GLUCOSE: 180 MG/DL (ref 74–99)

## 2022-09-02 PROCEDURE — 7100000010 HC PHASE II RECOVERY - FIRST 15 MIN: Performed by: PODIATRIST

## 2022-09-02 PROCEDURE — 6360000002 HC RX W HCPCS: Performed by: NURSE ANESTHETIST, CERTIFIED REGISTERED

## 2022-09-02 PROCEDURE — 3600000003 HC SURGERY LEVEL 3 BASE: Performed by: PODIATRIST

## 2022-09-02 PROCEDURE — 82962 GLUCOSE BLOOD TEST: CPT

## 2022-09-02 PROCEDURE — 3700000000 HC ANESTHESIA ATTENDED CARE: Performed by: PODIATRIST

## 2022-09-02 PROCEDURE — 3600000013 HC SURGERY LEVEL 3 ADDTL 15MIN: Performed by: PODIATRIST

## 2022-09-02 PROCEDURE — 88305 TISSUE EXAM BY PATHOLOGIST: CPT

## 2022-09-02 PROCEDURE — 88311 DECALCIFY TISSUE: CPT

## 2022-09-02 PROCEDURE — 2580000003 HC RX 258: Performed by: NURSE ANESTHETIST, CERTIFIED REGISTERED

## 2022-09-02 PROCEDURE — 2580000003 HC RX 258: Performed by: PODIATRIST

## 2022-09-02 PROCEDURE — 7100000011 HC PHASE II RECOVERY - ADDTL 15 MIN: Performed by: PODIATRIST

## 2022-09-02 PROCEDURE — 2709999900 HC NON-CHARGEABLE SUPPLY: Performed by: PODIATRIST

## 2022-09-02 PROCEDURE — 88307 TISSUE EXAM BY PATHOLOGIST: CPT

## 2022-09-02 PROCEDURE — 3700000001 HC ADD 15 MINUTES (ANESTHESIA): Performed by: PODIATRIST

## 2022-09-02 PROCEDURE — 2500000003 HC RX 250 WO HCPCS: Performed by: PODIATRIST

## 2022-09-02 RX ORDER — BUPIVACAINE HYDROCHLORIDE 5 MG/ML
INJECTION, SOLUTION EPIDURAL; INTRACAUDAL PRN
Status: DISCONTINUED | OUTPATIENT
Start: 2022-09-02 | End: 2022-09-02 | Stop reason: HOSPADM

## 2022-09-02 RX ORDER — FENTANYL CITRATE 50 UG/ML
25 INJECTION, SOLUTION INTRAMUSCULAR; INTRAVENOUS EVERY 5 MIN PRN
Status: CANCELLED | OUTPATIENT
Start: 2022-09-02

## 2022-09-02 RX ORDER — SODIUM CHLORIDE 0.9 % (FLUSH) 0.9 %
5-40 SYRINGE (ML) INJECTION PRN
Status: DISCONTINUED | OUTPATIENT
Start: 2022-09-02 | End: 2022-09-02 | Stop reason: HOSPADM

## 2022-09-02 RX ORDER — FENTANYL CITRATE 50 UG/ML
50 INJECTION, SOLUTION INTRAMUSCULAR; INTRAVENOUS EVERY 5 MIN PRN
Status: CANCELLED | OUTPATIENT
Start: 2022-09-02

## 2022-09-02 RX ORDER — MEPERIDINE HYDROCHLORIDE 25 MG/ML
12.5 INJECTION INTRAMUSCULAR; INTRAVENOUS; SUBCUTANEOUS EVERY 5 MIN PRN
Status: DISCONTINUED | OUTPATIENT
Start: 2022-09-02 | End: 2022-09-02 | Stop reason: HOSPADM

## 2022-09-02 RX ORDER — SODIUM CHLORIDE 0.9 % (FLUSH) 0.9 %
5-40 SYRINGE (ML) INJECTION EVERY 12 HOURS SCHEDULED
Status: DISCONTINUED | OUTPATIENT
Start: 2022-09-02 | End: 2022-09-02 | Stop reason: HOSPADM

## 2022-09-02 RX ORDER — SODIUM CHLORIDE 0.9 % (FLUSH) 0.9 %
5-40 SYRINGE (ML) INJECTION PRN
Status: CANCELLED | OUTPATIENT
Start: 2022-09-02

## 2022-09-02 RX ORDER — M-VIT,TX,IRON,MINS/CALC/FOLIC 27MG-0.4MG
1 TABLET ORAL DAILY
COMMUNITY

## 2022-09-02 RX ORDER — FENTANYL CITRATE 50 UG/ML
INJECTION, SOLUTION INTRAMUSCULAR; INTRAVENOUS PRN
Status: DISCONTINUED | OUTPATIENT
Start: 2022-09-02 | End: 2022-09-02 | Stop reason: SDUPTHER

## 2022-09-02 RX ORDER — SODIUM CHLORIDE 9 MG/ML
INJECTION, SOLUTION INTRAVENOUS CONTINUOUS PRN
Status: DISCONTINUED | OUTPATIENT
Start: 2022-09-02 | End: 2022-09-02 | Stop reason: SDUPTHER

## 2022-09-02 RX ORDER — ONDANSETRON 2 MG/ML
4 INJECTION INTRAMUSCULAR; INTRAVENOUS
Status: DISCONTINUED | OUTPATIENT
Start: 2022-09-02 | End: 2022-09-02 | Stop reason: HOSPADM

## 2022-09-02 RX ORDER — FENTANYL CITRATE 50 UG/ML
25 INJECTION, SOLUTION INTRAMUSCULAR; INTRAVENOUS EVERY 5 MIN PRN
Status: DISCONTINUED | OUTPATIENT
Start: 2022-09-02 | End: 2022-09-02 | Stop reason: HOSPADM

## 2022-09-02 RX ORDER — OXYCODONE HYDROCHLORIDE AND ACETAMINOPHEN 5; 325 MG/1; MG/1
1 TABLET ORAL EVERY 6 HOURS PRN
Qty: 28 TABLET | Refills: 0 | Status: SHIPPED | OUTPATIENT
Start: 2022-09-02 | End: 2022-09-09

## 2022-09-02 RX ORDER — SODIUM CHLORIDE 0.9 % (FLUSH) 0.9 %
5-40 SYRINGE (ML) INJECTION EVERY 12 HOURS SCHEDULED
Status: CANCELLED | OUTPATIENT
Start: 2022-09-02

## 2022-09-02 RX ORDER — CEFAZOLIN SODIUM 1 G/3ML
INJECTION, POWDER, FOR SOLUTION INTRAMUSCULAR; INTRAVENOUS PRN
Status: DISCONTINUED | OUTPATIENT
Start: 2022-09-02 | End: 2022-09-02 | Stop reason: SDUPTHER

## 2022-09-02 RX ORDER — SODIUM CHLORIDE 9 MG/ML
INJECTION, SOLUTION INTRAVENOUS PRN
Status: DISCONTINUED | OUTPATIENT
Start: 2022-09-02 | End: 2022-09-02 | Stop reason: HOSPADM

## 2022-09-02 RX ORDER — MIDAZOLAM HYDROCHLORIDE 1 MG/ML
INJECTION INTRAMUSCULAR; INTRAVENOUS PRN
Status: DISCONTINUED | OUTPATIENT
Start: 2022-09-02 | End: 2022-09-02 | Stop reason: SDUPTHER

## 2022-09-02 RX ORDER — SODIUM CHLORIDE 9 MG/ML
INJECTION, SOLUTION INTRAVENOUS PRN
Status: CANCELLED | OUTPATIENT
Start: 2022-09-02

## 2022-09-02 RX ORDER — ONDANSETRON 2 MG/ML
4 INJECTION INTRAMUSCULAR; INTRAVENOUS
Status: CANCELLED | OUTPATIENT
Start: 2022-09-02 | End: 2022-09-02

## 2022-09-02 RX ORDER — PROPOFOL 10 MG/ML
INJECTION, EMULSION INTRAVENOUS CONTINUOUS PRN
Status: DISCONTINUED | OUTPATIENT
Start: 2022-09-02 | End: 2022-09-02 | Stop reason: SDUPTHER

## 2022-09-02 RX ORDER — MEPERIDINE HYDROCHLORIDE 25 MG/ML
12.5 INJECTION INTRAMUSCULAR; INTRAVENOUS; SUBCUTANEOUS EVERY 5 MIN PRN
Status: CANCELLED | OUTPATIENT
Start: 2022-09-02

## 2022-09-02 RX ORDER — FENTANYL CITRATE 50 UG/ML
50 INJECTION, SOLUTION INTRAMUSCULAR; INTRAVENOUS EVERY 5 MIN PRN
Status: DISCONTINUED | OUTPATIENT
Start: 2022-09-02 | End: 2022-09-02 | Stop reason: HOSPADM

## 2022-09-02 RX ADMIN — SODIUM CHLORIDE: 9 INJECTION, SOLUTION INTRAVENOUS at 05:35

## 2022-09-02 RX ADMIN — CEFAZOLIN 3000 MG: 1 INJECTION, POWDER, FOR SOLUTION INTRAMUSCULAR; INTRAVENOUS at 07:13

## 2022-09-02 RX ADMIN — FENTANYL CITRATE 50 MCG: 50 INJECTION, SOLUTION INTRAMUSCULAR; INTRAVENOUS at 07:17

## 2022-09-02 RX ADMIN — PROPOFOL 100 MCG/KG/MIN: 10 INJECTION, EMULSION INTRAVENOUS at 07:14

## 2022-09-02 RX ADMIN — SODIUM CHLORIDE: 9 INJECTION, SOLUTION INTRAVENOUS at 06:34

## 2022-09-02 RX ADMIN — FENTANYL CITRATE 50 MCG: 50 INJECTION, SOLUTION INTRAMUSCULAR; INTRAVENOUS at 07:14

## 2022-09-02 RX ADMIN — MIDAZOLAM 2 MG: 1 INJECTION INTRAMUSCULAR; INTRAVENOUS at 07:08

## 2022-09-02 ASSESSMENT — PAIN SCALES - GENERAL
PAINLEVEL_OUTOF10: 0

## 2022-09-02 ASSESSMENT — PAIN - FUNCTIONAL ASSESSMENT: PAIN_FUNCTIONAL_ASSESSMENT: NONE - DENIES PAIN

## 2022-09-02 NOTE — ANESTHESIA PRE PROCEDURE
Department of Anesthesiology  Preprocedure Note       Name:  Allison Irvin   Age:  50 y.o.  :  1975                                          MRN:  [id number]         Date:  2022      Surgeon: Erika Prieto):  Karene Aase, DPM    Procedure: Procedure(s):  RIGHT GREAT TOE AMPUTATION    Medications prior to admission:   Prior to Admission medications    Medication Sig Start Date End Date Taking?  Authorizing Provider   Multiple Vitamins-Minerals (THERAPEUTIC MULTIVITAMIN-MINERALS) tablet Take 1 tablet by mouth daily   Yes Historical Provider, MD   omeprazole (PRILOSEC) 20 MG delayed release capsule Take 40 mg by mouth daily   Yes Historical Provider, MD   VITAMIN D PO Take by mouth daily   Yes Historical Provider, MD   ZINC PO Take 1 tablet by mouth daily   Yes Historical Provider, MD   nicotine polacrilex (NICORETTE) 2 MG gum Take 2 mg by mouth as needed for Smoking cessation Not using   Yes Historical Provider, MD   fluticasone (FLONASE) 50 MCG/ACT nasal spray 1 spray by Each Nostril route daily    Historical Provider, MD   Ascorbic Acid (VITAMIN C) 1000 MG tablet Take 1,000 mg by mouth daily    Historical Provider, MD   Dulaglutide (TRULICITY) 1.5 SX/5.2EN SOPN Inject 1.5 mg into the skin once a week  -  injections    Historical Provider, MD   glipiZIDE (GLUCOTROL) 5 MG tablet Take 5 mg by mouth 2 times daily (before meals)    Historical Provider, MD   Blood Glucose Monitoring Suppl (ACCU-CHEK ADVANTAGE DIABETES) KIT 1 each by Does not apply route daily 2/15/16   Sandra Glaser MD   metFORMIN (GLUCOPHAGE) 1000 MG tablet Take 1 tablet by mouth 2 times daily (with meals) 2/15/16   MALI Butler CNP   cetirizine (ZYRTEC ALLERGY) 10 MG tablet Take 1 tablet by mouth daily 2/15/16   MALI Butler CNP   lisinopril (PRINIVIL;ZESTRIL) 20 MG tablet Take 1 tablet by mouth daily 2/15/16   MALI Butler CNP   levothyroxine (SYNTHROID) 150 MCG tablet Take 200 mcg by mouth Daily Historical Provider, MD   atorvastatin (LIPITOR) 20 MG tablet Take 40 mg by mouth nightly    Historical Provider, MD       Current medications:    Current Facility-Administered Medications   Medication Dose Route Frequency Provider Last Rate Last Admin    sodium chloride flush 0.9 % injection 5-40 mL  5-40 mL IntraVENous 2 times per day [place], DPM        sodium chloride flush 0.9 % injection 5-40 mL  5-40 mL IntraVENous PRN [place], DPM        0.9 % sodium chloride infusion   IntraVENous PRN Cindy DPBRADEN 20 mL/hr at 22 0535 New Bag at 22 0535    ceFAZolin (ANCEF) 3,000 mg in dextrose 5 % 100 mL IVPB  3,000 mg IntraVENous On Call to GALILEO Salmeron           Allergies:  No Known Allergies    Problem List:    Patient Active Problem List   Diagnosis Code    [facility] uncontrolled [facility] E11.65    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    Morbid obesity ([facility] E66.01    Community acquired pneumonia J18.9    SIRS (systemic inflammatory response syndrome) ([facility]) R65.10    Lactic acidosis E87.2    RAY on CPAP G47.33, Z99.89    Pneumonia of right middle lobe due to infectious organism J18.9    Non-pressure chronic 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 ([facility] L97.512    DM type 2 with diabetic peripheral neuropathy ([facility]) E11.42    Poor venous access I87.8       Past Medical History:        Diagnosis Date    Diabetes mellitus ([facility]     Hypertension     Sleep apnea     Thyroid disease        Past Surgical History:        Procedure Laterality Date    EYE SURGERY      GASTROCNEMIUS RECESSION Right 3/26/2021    GASTROCNEMIUS RECESSION RIGHT FOOT performed by Karen Ahmadi DPM at [address] [place] [address]      TONSILLECTOMY         Social History:    Social History     Tobacco Use    Smoking status: Some Days     Packs/day: 0.25     Types: Cigarettes     Last attempt to quit: 2020     Years since quittin.7    Smokeless tobacco: Never   Substance Use Topics    Alcohol use: Yes     Comment: occ- rare                                Ready to quit: Yes  Counseling given: No      Vital Signs (Current):   Vitals:    08/30/22 1430 09/02/22 0521   BP:  136/86   Pulse:  96   Resp:  18   Temp:  36.2 °C (97.2 °F)   TempSrc:  Temporal   SpO2:  95%   Weight: 295 lb (133.8 kg) 295 lb (133.8 kg)   Height: 6' 2\" (1.88 m) 6' 2\" (1.88 m)                                              BP Readings from Last 3 Encounters:   09/02/22 136/86   03/26/21 113/78   03/26/21 122/82       NPO Status: Time of last liquid consumption: 1900                        Time of last solid consumption: 1900                        Date of last liquid consumption: 09/01/22                        Date of last solid food consumption: 09/01/22    BMI:   Wt Readings from Last 3 Encounters:   09/02/22 295 lb (133.8 kg)   03/26/21 280 lb (127 kg)   06/22/17 280 lb (127 kg)     Body mass index is 37.88 kg/m². CBC:   Lab Results   Component Value Date/Time    WBC 8.3 03/26/2021 05:45 AM    RBC 4.42 03/26/2021 05:45 AM    HGB 14.4 03/26/2021 05:45 AM    HCT 43.0 03/26/2021 05:45 AM    MCV 97.3 03/26/2021 05:45 AM    RDW 12.8 03/26/2021 05:45 AM     03/26/2021 05:45 AM       CMP:   Lab Results   Component Value Date/Time     03/26/2021 05:45 AM    K 4.2 03/26/2021 05:45 AM     03/26/2021 05:45 AM    CO2 25 03/26/2021 05:45 AM    BUN 16 03/26/2021 05:45 AM    CREATININE 0.7 03/26/2021 05:45 AM    GFRAA >60 03/26/2021 05:45 AM    LABGLOM >60 03/26/2021 05:45 AM    GLUCOSE 121 03/26/2021 05:45 AM    PROT 6.7 08/24/2017 08:15 AM    CALCIUM 8.9 03/26/2021 05:45 AM    BILITOT 0.5 08/24/2017 08:15 AM    ALKPHOS 76 08/24/2017 08:15 AM    AST 16 08/24/2017 08:15 AM    ALT 24 08/24/2017 08:15 AM       POC Tests: No results for input(s): POCGLU, POCNA, POCK, POCCL, POCBUN, POCHEMO, POCHCT in the last 72 hours.     Coags: No results found for: PROTIME, INR, APTT    HCG (If Applicable): No results found for: PREGTESTUR, PREGSERUM, HCG, HCGQUANT     ABGs: No results found for: PHART, PO2ART, HMQ4OPL, TEN0LER, BEART, G0KLIVEC     Type & Screen (If Applicable):  No results found for: LABABO, LABRH    Drug/Infectious Status (If Applicable):  No results found for: HIV, HEPCAB    COVID-19 Screening (If Applicable): No results found for: COVID19        Anesthesia Evaluation  Patient summary reviewed and Nursing notes reviewed  Airway: Mallampati: III  TM distance: >3 FB   Neck ROM: full  Mouth opening: > = 3 FB   Dental:          Pulmonary: breath sounds clear to auscultation  (+) pneumonia:  sleep apnea: on noncompliant,  asthma:           Patient smoked on day of surgery. ROS comment: Pneumonia in 2020; asthma as a child; sleep apnea but did not get fitted for the mask. Smokes socially, last cigarette was 2 days ago per patient   Cardiovascular:    (+) hypertension:,       ECG reviewed  Rhythm: regular  Rate: normal           Beta Blocker:  Not on Beta Blocker         Neuro/Psych:   (+) neuromuscular disease:,             GI/Hepatic/Renal:   (+) GERD: well controlled, morbid obesity          Endo/Other:    (+) DiabetesType II DM, , hypothyroidism::., .                 Abdominal:   (+) obese,           Vascular: Other Findings:           Anesthesia Plan      MAC     ASA 3             Anesthetic plan and risks discussed with patient and child/children. Plan discussed with CRNA. Hernando Segura RN   9/2/2022        Pt seen questions answered plan outlined no interim changes H&P reviewed accepts MAC. Emigdio Spain M.D 09/02/2022 9913.

## 2022-09-02 NOTE — ANESTHESIA POSTPROCEDURE EVALUATION
Department of Anesthesiology  Postprocedure Note    Patient: Emmanuel Klein  MRN: [id number]  [place]: 1975  Date of evaluation: 9/2/2022      Procedure Summary     Date: 09/02/22 Room / Location: [facility] OR 06 / [facility]    Anesthesia Start: 8716 Anesthesia Stop: 6476    Procedure: RIGHT PARTIAL GREAT TOE AMPUTATION (Right: Toes) Diagnosis:       Osteomyelitis, unspecified site, unspecified type ([facility]      (Osteomyelitis)    Surgeons: Atul Gallo DPM Responsible Provider: Shanda Rucker MD    Anesthesia Type: MAC ASA Status: 3          Anesthesia Type: No value filed.     Cruz Phase I: Cruz Score: 10    Cruz Phase II: Cruz Score: 10      Anesthesia Post Evaluation    Patient location during evaluation: bedside  Patient participation: complete - patient participated  Level of consciousness: awake  Pain score: 2  Airway patency: patent  Nausea & Vomiting: no nausea  Complications: no  Cardiovascular status: blood pressure returned to baseline  Respiratory status: acceptable  Hydration status: euvolemic

## 2022-09-02 NOTE — PROGRESS NOTES
Discharge summary. Instructions reviewed and patient verbalizes understanding. Orthopedic shoe boot applied and ice given. Reinforced instructions for limited weight bearing  ace wrap remains dry and intact. discharged via wheelchair to car.

## 2022-09-02 NOTE — BRIEF OP NOTE
Brief Postoperative Note      Patient: Tiff Menezes  YOB: 1975  MRN: [id number]    Date of Procedure: 9/2/2022    Pre-Op Diagnosis: Osteomyelitis right great toe    Post-Op Diagnosis: Same       Procedure(s):  RIGHT PARTIAL GREAT TOE AMPUTATION    Surgeon(s):  Chandu Cook DPM    Assistant:  * No surgical staff found *    Anesthesia: Monitor Anesthesia Care    Estimated Blood Loss (mL): Minimal    Complications: None    Specimens:   ID Type Source Tests Collected by Time Destination   A : RIGHT GREAT TOE BONE BIOPSY Bone Bone SURGICAL PATHOLOGY Chandu Cook DPM 9/2/2022 0731    B : PARTIAL RIGHT GREAT TOE  Specimen Toe SURGICAL PATHOLOGY Chandu Cook DPM 9/2/2022 0734        Implants:  * No implants in log *      Drains: * No LDAs found *    Findings: See full op report    Electronically signed by Siria Manzano DPM on 9/2/2022 at 7:51 AM

## 2022-09-02 NOTE — DISCHARGE INSTRUCTIONS
Post-Op Discharge Instructions    Fluids and Diet  1. Begin with clear liquids, bouillon, dry toast, soda crackers  2. If NOT nauseated, you may resume a regular diet when you desire    Medications  1. Take prescription medications only as directed  2. If pain is not severe, you may take the non-prescription medication that you normally take. 3. If any side effects or adverse reactions occur, discontinue the medication and contact your doctor. 4. Review the patient drug information leaflet, when provided, before you begin taking the medication    Ambulation and Activity  1. You are advised to go directly home from the hospital  2. Use the prescribed walking aids. 3. Partial weightbearing to right foot in surgical shoe. 4. Do not lift or move heavy objects  5. Do not Drive    Post Anesthesia Instructions  1. Do not drive or operate machinery  2. Do not consume alcohol, tranquilizers, sleeping medications, or a non-prescription pain medication  3. Do not make important decisions  4. You should have someone home with you tonight    Care of the Operative Site  1. Keep cast or bandage clean, dry, and intact  2. Do not shower  3. Do not remove bandage  4. Elevate Operative extremity as much as possible to reduce swelling and discomfort for the first 72 hours  5. Apply ice bag wrapped in thick towel over bandage 20 minutes of every hour for the first 72 hours while awake  6. Do not attempt to put anything between the cast or dressing and skin, some itching is normal    Special Instructions: Call doctor immediately if you develop any of the followin. Fever over 100 degrees by mouth - take your temperature daily  2. Pain not relieved by medication ordered  3. Swelling, increased redness, warmth, or hardness around operative area  4. Numb, tingling or cold toes  5. Toe(s) become white or bluish  6. Bandage becomes wet, soiled, or blood soaked (a small amount of bleeding may be normal)  7.  Increasing or progressive drainage from surgical area    Follow up - Call Dr. Hiro Short office for a follow-up appointment

## 2023-12-22 ENCOUNTER — HOSPITAL ENCOUNTER (EMERGENCY)
Age: 48
Discharge: HOME | End: 2023-12-23
Attending: EMERGENCY MEDICINE
Payer: COMMERCIAL

## 2023-12-22 DIAGNOSIS — L05.01 CYST, PILONIDAL, WITH ABSCESS: Primary | ICD-10-CM

## 2023-12-22 PROCEDURE — 99283 EMERGENCY DEPT VISIT LOW MDM: CPT

## 2023-12-23 VITALS
SYSTOLIC BLOOD PRESSURE: 154 MMHG | OXYGEN SATURATION: 97 % | HEART RATE: 97 BPM | RESPIRATION RATE: 20 BRPM | DIASTOLIC BLOOD PRESSURE: 90 MMHG | TEMPERATURE: 97.7 F

## 2023-12-23 PROBLEM — L05.01 CYST, PILONIDAL, WITH ABSCESS: Status: ACTIVE | Noted: 2023-12-23

## 2023-12-23 PROCEDURE — 6370000000 HC RX 637 (ALT 250 FOR IP): Performed by: EMERGENCY MEDICINE

## 2023-12-23 PROCEDURE — 6370000000 HC RX 637 (ALT 250 FOR IP)

## 2023-12-23 RX ORDER — LEVOFLOXACIN 500 MG/1
TABLET, FILM COATED ORAL
Status: COMPLETED
Start: 2023-12-23 | End: 2023-12-23

## 2023-12-23 RX ORDER — LEVOFLOXACIN 500 MG/1
500 TABLET, FILM COATED ORAL DAILY
Qty: 10 TABLET | Refills: 0 | Status: SHIPPED | OUTPATIENT
Start: 2023-12-23 | End: 2024-01-02

## 2023-12-23 RX ORDER — LEVOFLOXACIN 500 MG/1
500 TABLET, FILM COATED ORAL DAILY
Status: DISCONTINUED | OUTPATIENT
Start: 2023-12-23 | End: 2023-12-23 | Stop reason: HOSPADM

## 2023-12-23 RX ORDER — CLINDAMYCIN HYDROCHLORIDE 300 MG/1
300 CAPSULE ORAL 4 TIMES DAILY
Qty: 40 CAPSULE | Refills: 0 | Status: SHIPPED | OUTPATIENT
Start: 2023-12-23 | End: 2024-01-02

## 2023-12-23 RX ORDER — CLINDAMYCIN HYDROCHLORIDE 150 MG/1
300 CAPSULE ORAL ONCE
Status: COMPLETED | OUTPATIENT
Start: 2023-12-23 | End: 2023-12-23

## 2023-12-23 RX ADMIN — LEVOFLOXACIN 500 MG: 500 TABLET, FILM COATED ORAL at 00:34

## 2023-12-23 RX ADMIN — CLINDAMYCIN HYDROCHLORIDE 300 MG: 150 CAPSULE ORAL at 00:34

## 2024-10-17 NOTE — OP NOTE
Operative Note      Patient: Zahida Padilla  YOB: 1975  MRN: [id number]    Date of Procedure: 9/2/2022    Pre-Op Diagnosis: Osteomyelitis right great toe    Post-Op Diagnosis: Same       Procedure(s):  RIGHT PARTIAL GREAT TOE AMPUTATION    Surgeon(s):  Vonnie Mccallum DPM    Assistant:   * No surgical staff found *    Anesthesia: Monitor Anesthesia Care    Estimated Blood Loss (mL): Minimal    Complications: None    Specimens:   ID Type Source Tests Collected by Time Destination   A : RIGHT GREAT TOE BONE BIOPSY Bone Bone SURGICAL PATHOLOGY Vonnie Mccallum DPM 9/2/2022 0731    B : PARTIAL RIGHT GREAT TOE  Specimen Toe SURGICAL PATHOLOGY Vonnie Mccallum DPM 9/2/2022 0734        Implants:  * No implants in log *      Drains: * No LDAs found *    Findings: Osteomyelitis distal phalanx of right hallux    Detailed Description of Procedure: Following satisfactory pre-op evaluation the patient was brought into the OR and kept on the hospital bed in the supine position. MAC sedation was administered by anesthesia. Following sedation a time out was then called identifying patient identity, procedure, operative location, birth date, allergies, antibiotics and other pertinent information in regards to the surgery to which everyone agreed. The foot was then prepped and draped in the usual sterile manner and lowered onto the surgical field. 20 cc of 0.5% marcaine plain was injected into the toe in a lowe block technique. A calf tourniquet was then inflated to 250 mmHg. At this time attention was directed to the great great toe where a fish mouth incision was made around the Hallux interphalangeal joint with a #15 blade. The incision was made full thickness down to bone. All vital structures were retracted out of the way.  At this time a #15 blade was used to excise the IPJ capsule and ligamentous attachments, freeing the distal phalanx from the foot and it was then removed from the surgical field and sent for specimen. At this time a ronjour was used to resect some of the head of the proximal phalanx which was sent as a clean margin. The surgical area was then flushed with copious amounts of sterile saline. The incision was then closed with 3-0 nylon in a simple interrupted technique  The foot was then dressed in 4x4, ABD, kerlex, lyric, and ACE wrap. The patient tolerated the procedure and anesthesia well. The patient left the OR with vital signs stable and CFT intact to all remaining digits.  Following a brief period of post anesthesia monitoring the patient will be released from the hospital.     Electronically signed by Debbie Baron DPM on 9/2/2022 at 7:52 AM Detail Level: Detailed Size Of Lesion In Cm (Optional): 0.5 X Size Of Lesion In Cm (Optional): 0.4

## 2024-12-20 ENCOUNTER — HOSPITAL ENCOUNTER (EMERGENCY)
Age: 49
Discharge: HOME | End: 2024-12-20
Attending: EMERGENCY MEDICINE
Payer: COMMERCIAL

## 2024-12-20 VITALS
SYSTOLIC BLOOD PRESSURE: 100 MMHG | DIASTOLIC BLOOD PRESSURE: 67 MMHG | BODY MASS INDEX: 35.94 KG/M2 | OXYGEN SATURATION: 97 % | WEIGHT: 280 LBS | HEART RATE: 95 BPM | HEIGHT: 74 IN | RESPIRATION RATE: 14 BRPM | TEMPERATURE: 98.7 F

## 2024-12-20 DIAGNOSIS — K61.1 PERIRECTAL ABSCESS: Primary | ICD-10-CM

## 2024-12-20 PROCEDURE — 6370000000 HC RX 637 (ALT 250 FOR IP): Performed by: EMERGENCY MEDICINE

## 2024-12-20 PROCEDURE — 87070 CULTURE OTHR SPECIMN AEROBIC: CPT

## 2024-12-20 PROCEDURE — 87077 CULTURE AEROBIC IDENTIFY: CPT

## 2024-12-20 PROCEDURE — 87205 SMEAR GRAM STAIN: CPT

## 2024-12-20 PROCEDURE — 99283 EMERGENCY DEPT VISIT LOW MDM: CPT

## 2024-12-20 RX ORDER — DOXYCYCLINE HYCLATE 100 MG
100 TABLET ORAL 2 TIMES DAILY
Qty: 20 TABLET | Refills: 0 | Status: SHIPPED | OUTPATIENT
Start: 2024-12-20 | End: 2024-12-20 | Stop reason: ALTCHOICE

## 2024-12-20 RX ORDER — OXYCODONE AND ACETAMINOPHEN 5; 325 MG/1; MG/1
1 TABLET ORAL EVERY 6 HOURS PRN
Qty: 12 TABLET | Refills: 0 | Status: SHIPPED | OUTPATIENT
Start: 2024-12-20 | End: 2024-12-23

## 2024-12-20 RX ORDER — TIRZEPATIDE 10 MG/.5ML
INJECTION, SOLUTION SUBCUTANEOUS
COMMUNITY

## 2024-12-20 RX ORDER — CLINDAMYCIN HYDROCHLORIDE 300 MG/1
300 CAPSULE ORAL 3 TIMES DAILY
Qty: 30 CAPSULE | Refills: 0 | Status: SHIPPED | OUTPATIENT
Start: 2024-12-20 | End: 2024-12-30

## 2024-12-20 RX ORDER — CLINDAMYCIN HYDROCHLORIDE 150 MG/1
300 CAPSULE ORAL ONCE
Status: COMPLETED | OUTPATIENT
Start: 2024-12-20 | End: 2024-12-20

## 2024-12-20 RX ADMIN — CLINDAMYCIN HYDROCHLORIDE 300 MG: 150 CAPSULE ORAL at 09:00

## 2024-12-20 ASSESSMENT — PAIN DESCRIPTION - LOCATION: LOCATION: BUTTOCKS

## 2024-12-20 ASSESSMENT — PAIN DESCRIPTION - ORIENTATION: ORIENTATION: UPPER

## 2024-12-20 ASSESSMENT — PAIN DESCRIPTION - PAIN TYPE: TYPE: ACUTE PAIN

## 2024-12-20 ASSESSMENT — PAIN DESCRIPTION - ONSET: ONSET: ON-GOING

## 2024-12-20 ASSESSMENT — PAIN DESCRIPTION - FREQUENCY: FREQUENCY: CONTINUOUS

## 2024-12-20 ASSESSMENT — PAIN - FUNCTIONAL ASSESSMENT
PAIN_FUNCTIONAL_ASSESSMENT: PREVENTS OR INTERFERES SOME ACTIVE ACTIVITIES AND ADLS
PAIN_FUNCTIONAL_ASSESSMENT: 0-10

## 2024-12-20 ASSESSMENT — PAIN DESCRIPTION - DESCRIPTORS: DESCRIPTORS: DISCOMFORT;PRESSURE

## 2024-12-20 ASSESSMENT — PAIN SCALES - GENERAL: PAINLEVEL_OUTOF10: 6

## 2024-12-20 NOTE — ED PROVIDER NOTES
EYE SURGERY      GASTROCNEMIUS RECESSION Right 3/26/2021    GASTROCNEMIUS RECESSION RIGHT FOOT performed by Neymar Banks DPM at [facility] [place]    SINUS SURGERY      TOE AMPUTATION Right 2022    RIGHT PARTIAL GREAT TOE AMPUTATION performed by Neymar Banks DPM at [facility] OR    TONSILLECTOMY         CURRENTMEDICATIONS       Previous Medications    BLOOD GLUCOSE MONITORING SUPPL (ACCU-CHEK ADVANTAGE DIABETES) KIT    1 each by Does not apply route daily    CETIRIZINE (ZYRTEC ALLERGY) 10 MG TABLET    Take 1 tablet by mouth daily    GLIPIZIDE (GLUCOTROL) 5 MG TABLET    Take 1 tablet by mouth 2 times daily (before meals)    LEVOTHYROXINE (SYNTHROID) 150 MCG TABLET    Take 200 mcg by mouth Daily    LISINOPRIL (PRINIVIL;ZESTRIL) 20 MG TABLET    Take 1 tablet by mouth daily    METFORMIN (GLUCOPHAGE) 1000 MG TABLET    Take 1 tablet by mouth 2 times daily (with meals)    MULTIPLE VITAMINS-MINERALS (THERAPEUTIC MULTIVITAMIN-MINERALS) TABLET    Take 1 tablet by mouth daily    TIRZEPATIDE (MOUNJARO) 10 MG/0.5ML SOAJ    Inject into the skin One weekly on Sundays       ALLERGIES     Patient has no known allergies.    FAMILYHISTORY       Family History   Problem Relation Age of Onset    High Blood Pressure Mother         SOCIAL HISTORY       Social History     Tobacco Use    Smoking status: Some Days     Current packs/day: 0.00     Types: Cigarettes     Last attempt to quit: 2020     Years since quittin.0    Smokeless tobacco: Never   Vaping Use    Vaping status: Some Days    Substances: Nicotine   Substance Use Topics    Alcohol use: Yes     Comment: occ- rare    Drug use: No       SCREENINGS        Mark Coma Scale  Eye Opening: Spontaneous  Best Verbal Response: Oriented  Best Motor Response: Obeys commands  Mark Coma Scale Score: 15                CIWA Assessment  BP: 100/67  Pulse: 95           PHYSICAL EXAM  1 or more Elements     ED Triage Vitals [24 0806]   BP Systolic BP Percentile Diastolic BP

## 2024-12-22 LAB
MICROORGANISM SPEC CULT: ABNORMAL
MICROORGANISM/AGENT SPEC: ABNORMAL
SPECIMEN DESCRIPTION: ABNORMAL

## (undated) DEVICE — LOWER EXT KNEE DRAPE: Brand: MEDLINE INDUSTRIES, INC.

## (undated) DEVICE — BANDAGE,GAUZE,4.5"X4.1YD,STERILE,LF: Brand: MEDLINE

## (undated) DEVICE — SET IRR L100IN DIA0.280IN C100ML 2 NVENT PIERCING PINS 3

## (undated) DEVICE — ANTISEPTIC 16OZ H PEROX 1ST AID ORAL DEBRIDING AGNT

## (undated) DEVICE — COVER,MAYO STAND,STERILE: Brand: MEDLINE

## (undated) DEVICE — PRECISION THIN (9.0 X 0.38 X 31.0MM)

## (undated) DEVICE — DRILL SYSTEM 7

## (undated) DEVICE — GLOVE ORANGE PI 7 1/2   MSG9075

## (undated) DEVICE — DRESSING PETRO W3XL8IN OIL EMUL N ADH GZ KNIT IMPREG CELOS

## (undated) DEVICE — TOWEL,OR,DSP,ST,BLUE,STD,6/PK,12PK/CS: Brand: MEDLINE

## (undated) DEVICE — GAUZE,SPONGE,4"X4",8PLY,STRL,LF,10/TRAY: Brand: MEDLINE

## (undated) DEVICE — ZIMMER® STERILE DISPOSABLE TOURNIQUET CUFF WITH PROTECTIVE SLEEVE AND PLC, DUAL PORT, SINGLE BLADDER, 18 IN. (46 CM)

## (undated) DEVICE — HOOK/ TRIANGLE BLADE SET: Brand: ENDOTRAC

## (undated) DEVICE — SET ORTHO 30 DEG SCOPE AND TROC

## (undated) DEVICE — STOCKINETTE,DOUBLE PLY,6X48,STERILE: Brand: MEDLINE

## (undated) DEVICE — BANDAGE COMPR W2INXL5YD BGE POLY COT E RECOVERABLE BRTH W/

## (undated) DEVICE — NEEDLE BNE MAR ASPIR 11GA L4IN TWO PC HNDL W/ PRB JAMSH

## (undated) DEVICE — DRIP REDUCTION MANIFOLD

## (undated) DEVICE — Device

## (undated) DEVICE — TUBING SUCT 12FR MAL ALUM SHFT FN CAP VENT UNIV CONN W/ OBT

## (undated) DEVICE — ELECTRODE PT RET AD L9FT HI MOIST COND ADH HYDRGEL CORDED

## (undated) DEVICE — PADDING,UNDERCAST,COTTON, 4"X4YD STERILE: Brand: MEDLINE

## (undated) DEVICE — SOLUTION IV IRRIG LACTATED RINGERS 3000ML 2B7487

## (undated) DEVICE — Z INACTIVE USE 2735373 APPLICATOR FBR LAIN COT WOOD TIP ECONOMICAL

## (undated) DEVICE — CAMERA STRYKER 1488 HD GEN

## (undated) DEVICE — BANDAGE,GAUZE,CONFORMING,3"X75",STRL,LF: Brand: MEDLINE

## (undated) DEVICE — TRAP,MUCUS SPECIMEN,40CC: Brand: MEDLINE

## (undated) DEVICE — INTENDED FOR TISSUE SEPARATION, AND OTHER PROCEDURES THAT REQUIRE A SHARP SURGICAL BLADE TO PUNCTURE OR CUT.: Brand: BARD-PARKER ® STAINLESS STEEL BLADES

## (undated) DEVICE — SET ORTHO STD STORTSTD1

## (undated) DEVICE — SYRINGE MED 10ML TRNSLUC BRL PLUNG BLK MRK POLYPR CTRL

## (undated) DEVICE — NEEDLE SYR 18GA L1.5IN RED PLAS HUB S STL BLNT FILL W/O

## (undated) DEVICE — SET ORTHO STD STORTSTD2

## (undated) DEVICE — GOWN,SIRUS,FABRNF,XL,20/CS: Brand: MEDLINE

## (undated) DEVICE — BANDAGE,GAUZE,CONFORMING,4"X75",STRL,LF: Brand: MEDLINE

## (undated) DEVICE — SURGICAL PROCEDURE PACK BASIC

## (undated) DEVICE — ZIMMER® STERILE DISPOSABLE TOURNIQUET CUFF WITH PROTECTIVE SLEEVE AND PLC, DUAL PORT, SINGLE BLADDER, 34 IN. (86 CM)

## (undated) DEVICE — SPLINT ORTH W5INXL5YD PLSTR OF PARIS LO EXOTHERM SMOOTH

## (undated) DEVICE — PRECISION THIN, OFFSET (5.5 X 0.38 X 25.0MM)

## (undated) DEVICE — APPLICATOR PREP 26ML 0.7% IOD POVACRYLEX 74% ISO ALC ST

## (undated) DEVICE — GLOVE ORTHO 8   MSG9480

## (undated) DEVICE — DRAPE EQUIP CARM 72X42 IN RUBBER BND CLP

## (undated) DEVICE — BANDAGE COMPR W4INXL5YD WHT BGE POLY COT M E WRP WV HK AND

## (undated) DEVICE — TOTAL KNEE PK

## (undated) DEVICE — NEEDLE HYPO 25GA L1.5IN BLU POLYPR HUB S STL REG BVL STR

## (undated) DEVICE — CONTROL SYRINGE LUER-LOCK TIP: Brand: MONOJECT

## (undated) DEVICE — DRESSING,GAUZE,XEROFORM,CURAD,1"X8",ST: Brand: CURAD